# Patient Record
Sex: MALE | Race: WHITE | Employment: OTHER | URBAN - NONMETROPOLITAN AREA
[De-identification: names, ages, dates, MRNs, and addresses within clinical notes are randomized per-mention and may not be internally consistent; named-entity substitution may affect disease eponyms.]

---

## 2018-01-25 ENCOUNTER — TRANSFERRED RECORDS (OUTPATIENT)
Dept: HEALTH INFORMATION MANAGEMENT | Facility: HOSPITAL | Age: 73
End: 2018-01-25

## 2018-03-05 ENCOUNTER — TRANSFERRED RECORDS (OUTPATIENT)
Dept: HEALTH INFORMATION MANAGEMENT | Facility: HOSPITAL | Age: 73
End: 2018-03-05

## 2018-04-27 NOTE — OR NURSING
RE:  Garrison Mayorga 3/26/18    Garrison is scheduled for a EGD & Colonoscopy with Dr. Cardona on 5/4/18.    Garrison is on Coumadin and will need instructions  on his Coumadin from Dr. Griffiths.    He told me someone called him and said to stop his Coumadin 10 days prior to the procedure but does not know who it is.  He said he did not have instructions when to restart it but usually takes his normal dose after the procedure.      Thank you!

## 2018-05-03 ENCOUNTER — ANESTHESIA EVENT (OUTPATIENT)
Dept: SURGERY | Facility: HOSPITAL | Age: 73
End: 2018-05-03
Payer: MEDICARE

## 2018-05-03 NOTE — ANESTHESIA PREPROCEDURE EVALUATION
Anesthesia Evaluation     . Pt has had prior anesthetic. Type: General and MAC    No history of anesthetic complications          ROS/MED HX    ENT/Pulmonary:  - neg pulmonary ROS     Neurologic:  - neg neurologic ROS     Cardiovascular: Comment: AFib    (+) Dyslipidemia, hypertension-range: 139/70 today patient states normal, ---. Taking blood thinners Pt has received instructions: Instructions Given to patient: quit taking coumadin 9 days ago, INR 1.02. . . :. Irregular Heartbeat/Palpitations, .       METS/Exercise Tolerance:  >4 METS   Hematologic:  - neg hematologic  ROS       Musculoskeletal:   (+) arthritis, , , -       GI/Hepatic:     (+) bowel prep,       Renal/Genitourinary:  - ROS Renal section negative       Endo:  - neg endo ROS       Psychiatric:  - neg psychiatric ROS       Infectious Disease:  - neg infectious disease ROS       Malignancy:      - no malignancy   Other:    (+) No chance of pregnancy C-spine cleared: N/A, H/O Chronic Pain,H/O chronic opiod use , no other significant disability                    Physical Exam  Normal systems: cardiovascular and pulmonary    Airway   Mallampati: III  TM distance: >3 FB  Neck ROM: limited    Dental   (+) caps    Cardiovascular   Rhythm and rate: regular and normal      Pulmonary    breath sounds clear to auscultation                    Anesthesia Plan      History & Physical Review  History and physical reviewed and following examination; no interval change.    ASA Status:  3 .    NPO Status:  > 8 hours    Plan for MAC Reason for MAC:  Procedure to face, neck, head or breast, Deep or markedly invasive procedure (G8) and Chronic cardiopulmonary disease (G9)    Risks and benefits of MAC anesthetic discussed including dental damage, aspiration, loss of airway, conversion to general anesthetic, CV complications, MI, stroke, death. Pt wishes to proceed.       Postoperative Care      Consents  Anesthetic plan, risks, benefits and alternatives discussed with:   Patient..                          .

## 2018-05-04 ENCOUNTER — ANESTHESIA (OUTPATIENT)
Dept: SURGERY | Facility: HOSPITAL | Age: 73
End: 2018-05-04
Payer: MEDICARE

## 2018-05-04 ENCOUNTER — APPOINTMENT (OUTPATIENT)
Dept: LAB | Facility: HOSPITAL | Age: 73
End: 2018-05-04
Payer: MEDICARE

## 2018-05-04 ENCOUNTER — HOSPITAL ENCOUNTER (OUTPATIENT)
Facility: HOSPITAL | Age: 73
Discharge: HOME | End: 2018-05-04
Attending: INTERNAL MEDICINE | Admitting: INTERNAL MEDICINE
Payer: MEDICARE

## 2018-05-04 VITALS
HEIGHT: 77 IN | OXYGEN SATURATION: 98 % | RESPIRATION RATE: 18 BRPM | SYSTOLIC BLOOD PRESSURE: 123 MMHG | BODY MASS INDEX: 35.42 KG/M2 | WEIGHT: 300 LBS | TEMPERATURE: 97 F | DIASTOLIC BLOOD PRESSURE: 54 MMHG

## 2018-05-04 LAB — INR PPP: 1.02 (ref 0.8–1.2)

## 2018-05-04 PROCEDURE — 25000125 ZZHC RX 250: Performed by: NURSE ANESTHETIST, CERTIFIED REGISTERED

## 2018-05-04 PROCEDURE — 40000305 ZZH STATISTIC PRE PROC ASSESS I: Performed by: INTERNAL MEDICINE

## 2018-05-04 PROCEDURE — 71000027 ZZH RECOVERY PHASE 2 EACH 15 MINS: Performed by: INTERNAL MEDICINE

## 2018-05-04 PROCEDURE — 85610 PROTHROMBIN TIME: CPT | Performed by: NURSE ANESTHETIST, CERTIFIED REGISTERED

## 2018-05-04 PROCEDURE — 36000050 ZZH SURGERY LEVEL 2 1ST 30 MIN: Performed by: INTERNAL MEDICINE

## 2018-05-04 PROCEDURE — 25000128 H RX IP 250 OP 636: Performed by: NURSE ANESTHETIST, CERTIFIED REGISTERED

## 2018-05-04 PROCEDURE — 99100 ANES PT EXTEME AGE<1 YR&>70: CPT | Performed by: NURSE ANESTHETIST, CERTIFIED REGISTERED

## 2018-05-04 PROCEDURE — 27210794 ZZH OR GENERAL SUPPLY STERILE: Performed by: INTERNAL MEDICINE

## 2018-05-04 PROCEDURE — 36415 COLL VENOUS BLD VENIPUNCTURE: CPT | Performed by: NURSE ANESTHETIST, CERTIFIED REGISTERED

## 2018-05-04 PROCEDURE — 88305 TISSUE EXAM BY PATHOLOGIST: CPT | Mod: TC | Performed by: INTERNAL MEDICINE

## 2018-05-04 PROCEDURE — 43239 EGD BIOPSY SINGLE/MULTIPLE: CPT | Performed by: NURSE ANESTHETIST, CERTIFIED REGISTERED

## 2018-05-04 PROCEDURE — 37000008 ZZH ANESTHESIA TECHNICAL FEE, 1ST 30 MIN: Performed by: INTERNAL MEDICINE

## 2018-05-04 RX ORDER — ONDANSETRON 4 MG/1
4 TABLET, ORALLY DISINTEGRATING ORAL EVERY 30 MIN PRN
Status: DISCONTINUED | OUTPATIENT
Start: 2018-05-04 | End: 2018-05-04 | Stop reason: HOSPADM

## 2018-05-04 RX ORDER — SODIUM CHLORIDE, SODIUM LACTATE, POTASSIUM CHLORIDE, CALCIUM CHLORIDE 600; 310; 30; 20 MG/100ML; MG/100ML; MG/100ML; MG/100ML
INJECTION, SOLUTION INTRAVENOUS CONTINUOUS
Status: DISCONTINUED | OUTPATIENT
Start: 2018-05-04 | End: 2018-05-04 | Stop reason: HOSPADM

## 2018-05-04 RX ORDER — NALOXONE HYDROCHLORIDE 0.4 MG/ML
.1-.4 INJECTION, SOLUTION INTRAMUSCULAR; INTRAVENOUS; SUBCUTANEOUS
Status: DISCONTINUED | OUTPATIENT
Start: 2018-05-04 | End: 2018-05-04 | Stop reason: HOSPADM

## 2018-05-04 RX ORDER — MEPERIDINE HYDROCHLORIDE 25 MG/ML
12.5 INJECTION INTRAMUSCULAR; INTRAVENOUS; SUBCUTANEOUS
Status: DISCONTINUED | OUTPATIENT
Start: 2018-05-04 | End: 2018-05-04 | Stop reason: HOSPADM

## 2018-05-04 RX ORDER — LIDOCAINE 40 MG/G
CREAM TOPICAL
Status: DISCONTINUED | OUTPATIENT
Start: 2018-05-04 | End: 2018-05-04 | Stop reason: HOSPADM

## 2018-05-04 RX ORDER — LIDOCAINE HYDROCHLORIDE 20 MG/ML
INJECTION, SOLUTION INFILTRATION; PERINEURAL PRN
Status: DISCONTINUED | OUTPATIENT
Start: 2018-05-04 | End: 2018-05-04

## 2018-05-04 RX ORDER — PHENYLEPHRINE HCL IN 0.9% NACL 1 MG/10 ML
SYRINGE (ML) INTRAVENOUS PRN
Status: DISCONTINUED | OUTPATIENT
Start: 2018-05-04 | End: 2018-05-04

## 2018-05-04 RX ORDER — PROPOFOL 10 MG/ML
INJECTION, EMULSION INTRAVENOUS PRN
Status: DISCONTINUED | OUTPATIENT
Start: 2018-05-04 | End: 2018-05-04

## 2018-05-04 RX ORDER — ONDANSETRON 2 MG/ML
4 INJECTION INTRAMUSCULAR; INTRAVENOUS EVERY 30 MIN PRN
Status: DISCONTINUED | OUTPATIENT
Start: 2018-05-04 | End: 2018-05-04 | Stop reason: HOSPADM

## 2018-05-04 RX ADMIN — SODIUM CHLORIDE, POTASSIUM CHLORIDE, SODIUM LACTATE AND CALCIUM CHLORIDE: 600; 310; 30; 20 INJECTION, SOLUTION INTRAVENOUS at 09:10

## 2018-05-04 RX ADMIN — SODIUM CHLORIDE, POTASSIUM CHLORIDE, SODIUM LACTATE AND CALCIUM CHLORIDE: 600; 310; 30; 20 INJECTION, SOLUTION INTRAVENOUS at 08:45

## 2018-05-04 RX ADMIN — PROPOFOL 50 MG: 10 INJECTION, EMULSION INTRAVENOUS at 10:05

## 2018-05-04 RX ADMIN — PROPOFOL 20 MG: 10 INJECTION, EMULSION INTRAVENOUS at 10:19

## 2018-05-04 RX ADMIN — PROPOFOL 20 MG: 10 INJECTION, EMULSION INTRAVENOUS at 10:17

## 2018-05-04 RX ADMIN — PROPOFOL 50 MG: 10 INJECTION, EMULSION INTRAVENOUS at 10:03

## 2018-05-04 RX ADMIN — PROPOFOL 20 MG: 10 INJECTION, EMULSION INTRAVENOUS at 10:09

## 2018-05-04 RX ADMIN — PROPOFOL 20 MG: 10 INJECTION, EMULSION INTRAVENOUS at 10:13

## 2018-05-04 RX ADMIN — LIDOCAINE HYDROCHLORIDE 40 MG: 20 INJECTION, SOLUTION INFILTRATION; PERINEURAL at 10:03

## 2018-05-04 RX ADMIN — PROPOFOL 20 MG: 10 INJECTION, EMULSION INTRAVENOUS at 10:21

## 2018-05-04 RX ADMIN — Medication 50 MCG: at 10:12

## 2018-05-04 RX ADMIN — PROPOFOL 20 MG: 10 INJECTION, EMULSION INTRAVENOUS at 10:11

## 2018-05-04 RX ADMIN — PROPOFOL 20 MG: 10 INJECTION, EMULSION INTRAVENOUS at 10:15

## 2018-05-04 RX ADMIN — PROPOFOL 20 MG: 10 INJECTION, EMULSION INTRAVENOUS at 10:07

## 2018-05-04 RX ADMIN — PROPOFOL 20 MG: 10 INJECTION, EMULSION INTRAVENOUS at 10:08

## 2018-05-04 NOTE — ANESTHESIA CARE TRANSFER NOTE
Patient: Garrison Mayorga    Procedure(s):  UPPER ENDOSCOPY WITH BIOPSIES AND COLONOSCOPY - Wound Class: II-Clean Contaminated    Diagnosis: ANEMIA  Diagnosis Additional Information: No value filed.    Anesthesia Type:   MAC     Note:  Airway :Nasal Cannula  Patient transferred to:Phase II  Handoff Report: Identifed the Patient, Identified the Reponsible Provider, Reviewed the pertinent medical history, Discussed the surgical course, Reviewed Intra-OP anesthesia mangement and issues during anesthesia, Set expectations for post-procedure period and Allowed opportunity for questions and acknowledgement of understanding      Vitals: (Last set prior to Anesthesia Care Transfer)    CRNA VITALS  5/4/2018 0953 - 5/4/2018 1028      5/4/2018             Resp Rate (set): 8                Electronically Signed By: NEGAR Salvador CRNA  May 4, 2018  10:28 AM

## 2018-05-04 NOTE — DISCHARGE INSTRUCTIONS
INSTRUCTIONS AFTER COLONOSCOPY    WHEN YOU ARE BACK HOME:    Plan to rest for an hour or two after you get home.    You may have some cramping or pressure until you pass gas.    You may resume your regular medications.    Eat a small, light meal at first, and then gradually return to normal meal sizes.  If you had a polyp removed:    Slight bleeding may occur.  You may have a slight blood stain on the toilet paper after a bowel movement.    To lessen the chance of bleeding, avoid heavy exercise for ONE WEEK.  This includes heavy lifting, vigorous sport activities, and heavy physical labor.  You may resume your normal sexual activity.      Avoid aspirin or aspirin products if instructed by your doctor.    WHAT TO WATCH FOR:  Problems rarely occur after the exam; however, it is important for you to watch for early signs of possible problems.  If you have     Unusual pain in your abdomen    Nausea and vomiting that persists    Excessive bleeding    Black or bloody bowel movements    Fever or temperature above 100.6 F  Please call your doctor ([facility] [phone]) or go to the nearest hospital emergency room.    Post-Anesthesia Patient Instructions    IMMEDIATELY FOLLOWING SURGERY:  Do not drive or operate machinery for the first twenty four hours after surgery.  Do not make any important decisions for twenty four hours after surgery or while taking narcotic pain medications or sedatives.  If you develop intractable nausea and vomiting or a severe headache please notify your doctor immediately.    FOLLOW-UP:  Please make an appointment with your surgeon as instructed. You do not need to follow up with anesthesia unless specifically instructed to do so.    WOUND CARE INSTRUCTIONS (if applicable):  Keep a dry clean dressing on the anesthesia/puncture wound site if there is drainage.  Once the wound has quit draining you may leave it open to air.  Generally you should leave the bandage intact for twenty four  hours unless there is drainage.  If the epidural site drains for more than 36-48 hours please call the anesthesia department.    QUESTIONS?:  Please feel free to call your physician or the hospital  if you have any questions, and they will be happy to assist you.             UPPER ENDOSCOPY    PURPOSE:  An Upper Endoscopy is a procedure in which the doctor passes a flexible, lighted tube called a gastroscope through your mouth into your stomach in order to:    See the lining of the esophagus, stomach, and duodenum (first part of the small intestine).    Look for bleeding, inflammation (swelling), abnormal tumors or tissue, or ulcers.    Take biopsy specimens.  (Biopsies do not hurt.)    TELL YOUR DOCTOR IF:     You have a heart condition, heart murmur, or have had heart valve surgery.    You have had angioplasty with stents put in within the last year.    You are taking blood thinners - Aspirin, Anti-inflammatory pain pills (like Motrin, ibuprofen, Naproxen, Feldene, Advil, etc.), Coumadin, Plavix.    You have diabetes - contact your regular doctor for management of your insulin.    YOU NEED TO:    Have someone drive you home.  You are not allowed to drive until the next day.  (If you take a taxi, the person staying with you after surgery must ride with you in the taxi.  The  is not responsible for you).    Have someone stay with you for four (4) hours after you leave the hospital.    Know the time to be at admitting:  A nurse will call you with the time the afternoon before your procedure.  If your procedure is on Monday, you will be called on Friday.  If you have not been contacted with the time, call the Admitting Department at [phone] or [phone], [phone] after 5 pm (Admitting is open 24 hours daily).    Talk with the Surgery Patient Education Nurses.  Please call them @ [phone] or toll free [phone] after 8:00 a.m. Monday through Friday.    TO PREPARE FOR THE  PROCEDURE:      ONE WEEK BEFORE:    Stop Aspirin, anti-inflammatory pain pills (Motrin, ibuprofen, Naproxen, Feldene, Advil, etc.).  It is OK to take Tylenol (acetaminophen).    Your doctor will tell you what to do if you are on Coumadin or Plavix.     NIGHT BEFORE:    Do not eat or drink anything after midnight.  Your stomach needs to be empty.     DAY OF YOUR PROCEDURE:    Take your pills you were told to take.  Do not take diabetic pills.  If you are on Insulin, take the dose your doctor told you to take.    Wear loose, comfortable clothing and shoes.    Remove ALL jewelry including wedding rings.  Leave valuables at home.    Come to the hospital Admitting Department located at the [place] entrance on the lower level.    In Same Day surgery, you will be asked to change into an exam gown.    You will be asked your name, birth date, and what you are having done by every person who is involved with your care.    Your health history, medications, and allergies will be reviewed and verified.    An IV (intravenous line) will be started in your hand.  DURING THE UPPER ENDOSCOPY:    Your doctor and a registered nurse will be with you throughout the procedure which takes about 30 minutes.    You will either lie on your left side or on your back.    Medications will be given to you to help you relax and reduce the gag reflex when swallowing the scope.    Your doctor may need to insert air into your stomach to see better.  This may cause fullness or a cramping sensation.  The air will be removed at the end of the exam.    AFTER THE PROCEDURE    You will return to Same Day Surgery to rest for about an hour before you go home.    The doctor will talk with you and your family.    A family member/friend may visit with you.    You may burp up any air remaining in your stomach.    You may feel dizzy or light-headed from the medicine.    Your nurse will go over the discharge instructions with you and your caregiver and answer any of  your questions.      You will be contacted the next day to check on how you are doing.    If biopsies were taken, you will be contacted with the results usually within 3 days.  BACK AT HOME    Rest for an hour or two after you get home.    When your throat is no longer numb and you have a gag reflex, take a few sips of cool water.  If you can swallow comfortably, you may start eating again.    You may have a mild sore throat for the rest of the day.  WHAT TO WATCH FOR:  Problems rarely occur after the exam, but it is important to be aware of the early signs of a complication.  Call your doctor immediately if you have:    Difficulty swallowing or breathing    Unusual pain in your stomach or chest    Vomiting blood or dark material that looks like coffee grounds    Black or tarry stools    Temperature over 100.6 degrees    MORE QUESTIONS?  Please ask your doctor or nurse before the exam begins  or call your doctor at the clinic.    IF YOU MUST CANCEL YOUR PROCEDURE THE EVENING/NIGHT BEFORE, PLEASE CALL HOSPITAL ADMITTING -[phone] OR TOLL FREE [phone], [phone].    Phone Numbers:  Hospital - [phone] [phone]  [facility] - [phone]  Surgery Patient Education - [phone] or toll free [phone]

## 2018-05-04 NOTE — ANESTHESIA POSTPROCEDURE EVALUATION
Patient: Garrison Mayorga    Procedure(s):  UPPER ENDOSCOPY WITH BIOPSIES AND COLONOSCOPY - Wound Class: II-Clean Contaminated    Diagnosis:ANEMIA  Diagnosis Additional Information: No value filed.    Anesthesia Type:  MAC    Note:  Anesthesia Post Evaluation    Patient location during evaluation: Phase 2  Patient participation: Able to fully participate in evaluation  Level of consciousness: awake and alert  Pain management: adequate  Airway patency: patent  Cardiovascular status: acceptable  Respiratory status: acceptable  Hydration status: acceptable  PONV: none             Last vitals:  Vitals:    05/04/18 1045 05/04/18 1050 05/04/18 1100   BP: 121/62 123/54    Resp: 18 18    Temp:      SpO2: 94% 99% 98%         Electronically Signed By: NEGAR Reardon CRNA  May 4, 2018  12:27 PM

## 2018-05-04 NOTE — H&P
Pre-Endoscopy History and Physical     Garrison Mayorga MRN# [id number]   YOB: 1945 Age: 73 year old     Primary care provider: Yusuf Griffiths  Type of Endoscopy: Colonoscopy with possible biopsy, possible polypectomy and Esophagogastroduodenoscopy with possible biopsy, possible dilation, possible foreign body removal  Reason for Procedure: Anemia, CRC Screening, Dyspepsia  Type of Anesthesia Anticipated: MAC    HPI:    Garrison is a 73 year old male who will be undergoing the above procedure.      A history and physical has been performed. The patient's medications and allergies have been reviewed. The risks and benefits of the procedure and the sedation options and risks were discussed with the patient.  All questions were answered and informed consent was obtained.      He denies a personal or family history of anesthesia complications or bleeding disorders.     There is no problem list on file for this patient.       Past Medical History:   Diagnosis Date     Antiplatelet or antithrombotic long-term use      Arrhythmia      Hypertension      Other chronic pain         Past Surgical History:   Procedure Laterality Date     BACK SURGERY      4 low back surgeries     CARDIAC SURGERY  12/26/2017    ablation [facility]     CARDIOVERSION  08/2017    [facility]     COLONOSCOPY  03/30/2006    normal      ORTHOPEDIC SURGERY  2007    left knee arthroplasty     ORTHOPEDIC SURGERY      left fourth trigger finger release     ORTHOPEDIC SURGERY  2009    total right hip     ORTHOPEDIC SURGERY  01/15/2015    removal of hardware right lower leg-Dr. Samson [facility]      ORTHOPEDIC SURGERY  05/02/2016    right rotator cuff repair  Dr. Lazo      SPINE SURGERY      anterior/posterior fusion       Social History   Substance Use Topics     Smoking status: Never Smoker     Smokeless tobacco: Never Used     Alcohol use Yes      Comment: occasional beer       History reviewed. No pertinent family  "history.    Prior to Admission medications    Medication Sig Start Date End Date Taking? Authorizing Provider   acetaminophen-codeine (TYLENOL #4) 300-60 MG per tablet Take 1-2 tablets by mouth every 6 hours as needed for moderate pain   Yes Reported, Patient   Atorvastatin Calcium (LIPITOR PO) Take 40 mg by mouth daily   Yes Reported, Patient   Furosemide (LASIX PO) Take 80 mg by mouth daily   Yes Reported, Patient   Omeprazole (PRILOSEC PO) Take 40 mg by mouth every morning   Yes Reported, Patient   OXCARBAZEPINE PO Take 600 mg by mouth 2 times daily Tegretol   Yes Reported, Patient   SOTALOL HCL PO Take 80 mg by mouth every 12 hours   Yes Reported, Patient   Warfarin Sodium (COUMADIN PO) Take 5 mg by mouth daily As directed   Yes Reported, Patient       No Known Allergies     REVIEW OF SYSTEMS:   5 point ROS negative except as noted above in HPI, including Gen., Resp., CV, GI &  system review.    PHYSICAL EXAM:   /70  Temp 97  F (36.1  C) (Oral)  Resp 16  Ht 1.956 m (6' 5\")  Wt 136.1 kg (300 lb)  SpO2 99%  BMI 35.57 kg/m2 Estimated body mass index is 35.57 kg/(m^2) as calculated from the following:    Height as of this encounter: 1.956 m (6' 5\").    Weight as of this encounter: 136.1 kg (300 lb).   GENERAL APPEARANCE: alert, and oriented  MENTAL STATUS: alert  AIRWAY EXAM: Mallampatti Class II (visualization of the soft palate, fauces, and uvula)  RESP: lungs clear to auscultation - no rales, rhonchi or wheezes  CV: regular rates and rhythm  DIAGNOSTICS:    Not indicated    IMPRESSION   ASA Class 2 - Mild systemic disease    PLAN:   Plan for Colonoscopy with possible biopsy, possible polypectomy and Esophagogastroduodenoscopy with possible biopsy, possible dilation, possible foreign body removal. We discussed the risks, benefits and alternatives and the patient wished to proceed.    The above has been forwarded to the consulting provider.      Signed Electronically by: Michel Cardona  May 4, " 2018

## 2018-05-04 NOTE — BRIEF OP NOTE
[facility] Brief Op Note    Pre-operative diagnosis: ANEMIA   Post-operative diagnosis Gastroduodenitis, Hiatal Hernia, Mild colonic pandiverticulosis   Procedure: Procedure(s):  UPPER ENDOSCOPY WITH BIOPSIES AND COLONOSCOPY - Wound Class: II-Clean Contaminated   Surgeon: Michel Cardona MD   Anesthesia: Monitor Anesthesia Care    Total IV fluids: (See anesthesia record)   Drains: None   Specimens: Biopsies   Findings: Gastroduodenitis, Hiatal Hernia, Mild colonic pandiverticulosis   Complications: None   Condition: Stable   Comments: See dictated operative report for full details     Michel Cardona MD  5/4/2018  10:23 AM

## 2018-05-04 NOTE — IP AVS SNAPSHOT
MRN:[id number]                      After Visit Summary   5/4/2018    Garrison Mayorga    MRN: [id number]           Thank you!     Thank you for choosing [facility] for your care. Our goal is always to provide you with excellent care. Hearing back from our patients is one way we can continue to improve our services. Please take a few minutes to complete the written survey that you may receive in the mail after you visit with us. Thank you!        Patient Information     Date Of Birth          1945        About your hospital stay     You were admitted on:  May 4, 2018 You last received care in the:  HI Preop/Phase II    You were discharged on:  May 4, 2018       Who to Call     For medical emergencies, please call 911.  For non-urgent questions about your medical care, please call your primary care provider or clinic, [phone]  For questions related to your surgery, please call your surgery clinic        Attending Provider     Provider Specialty    Michel Cardona MD Gastroenterology       Primary Care Provider Office Phone # Fax #    Yusuf Griffiths [phone]      Further instructions from your care team           INSTRUCTIONS AFTER COLONOSCOPY    WHEN YOU ARE BACK HOME:    Plan to rest for an hour or two after you get home.    You may have some cramping or pressure until you pass gas.    You may resume your regular medications.    Eat a small, light meal at first, and then gradually return to normal meal sizes.  If you had a polyp removed:    Slight bleeding may occur.  You may have a slight blood stain on the toilet paper after a bowel movement.    To lessen the chance of bleeding, avoid heavy exercise for ONE WEEK.  This includes heavy lifting, vigorous sport activities, and heavy physical labor.  You may resume your normal sexual activity.      Avoid aspirin or aspirin products if instructed by your doctor.    WHAT TO WATCH FOR:  Problems rarely occur after the exam;  however, it is important for you to watch for early signs of possible problems.  If you have     Unusual pain in your abdomen    Nausea and vomiting that persists    Excessive bleeding    Black or bloody bowel movements    Fever or temperature above 100.6 F  Please call your doctor ([facility] [phone]) or go to the nearest hospital emergency room.    Post-Anesthesia Patient Instructions    IMMEDIATELY FOLLOWING SURGERY:  Do not drive or operate machinery for the first twenty four hours after surgery.  Do not make any important decisions for twenty four hours after surgery or while taking narcotic pain medications or sedatives.  If you develop intractable nausea and vomiting or a severe headache please notify your doctor immediately.    FOLLOW-UP:  Please make an appointment with your surgeon as instructed. You do not need to follow up with anesthesia unless specifically instructed to do so.    WOUND CARE INSTRUCTIONS (if applicable):  Keep a dry clean dressing on the anesthesia/puncture wound site if there is drainage.  Once the wound has quit draining you may leave it open to air.  Generally you should leave the bandage intact for twenty four hours unless there is drainage.  If the epidural site drains for more than 36-48 hours please call the anesthesia department.    QUESTIONS?:  Please feel free to call your physician or the hospital  if you have any questions, and they will be happy to assist you.             UPPER ENDOSCOPY    PURPOSE:  An Upper Endoscopy is a procedure in which the doctor passes a flexible, lighted tube called a gastroscope through your mouth into your stomach in order to:    See the lining of the esophagus, stomach, and duodenum (first part of the small intestine).    Look for bleeding, inflammation (swelling), abnormal tumors or tissue, or ulcers.    Take biopsy specimens.  (Biopsies do not hurt.)    TELL YOUR DOCTOR IF:     You have a heart condition, heart murmur, or have  had heart valve surgery.    You have had angioplasty with stents put in within the last year.    You are taking blood thinners - Aspirin, Anti-inflammatory pain pills (like Motrin, ibuprofen, Naproxen, Feldene, Advil, etc.), Coumadin, Plavix.    You have diabetes - contact your regular doctor for management of your insulin.    YOU NEED TO:    Have someone drive you home.  You are not allowed to drive until the next day.  (If you take a taxi, the person staying with you after surgery must ride with you in the taxi.  The  is not responsible for you).    Have someone stay with you for four (4) hours after you leave the hospital.    Know the time to be at admitting:  A nurse will call you with the time the afternoon before your procedure.  If your procedure is on Monday, you will be called on Friday.  If you have not been contacted with the time, call the Admitting Department at [phone] or [phone], ext. [phone] after 5 pm (Admitting is open 24 hours daily).    Talk with the Surgery Patient Education Nurses.  Please call them @ [phone] or toll free [phone] after 8:00 a.m. Monday through Friday.    TO PREPARE FOR THE PROCEDURE:      ONE WEEK BEFORE:    Stop Aspirin, anti-inflammatory pain pills (Motrin, ibuprofen, Naproxen, Feldene, Advil, etc.).  It is OK to take Tylenol (acetaminophen).    Your doctor will tell you what to do if you are on Coumadin or Plavix.     NIGHT BEFORE:    Do not eat or drink anything after midnight.  Your stomach needs to be empty.     DAY OF YOUR PROCEDURE:    Take your pills you were told to take.  Do not take diabetic pills.  If you are on Insulin, take the dose your doctor told you to take.    Wear loose, comfortable clothing and shoes.    Remove ALL jewelry including wedding rings.  Leave valuables at home.    Come to the hospital Admitting Department located at the [facility] on the lower level.    In Same Day surgery, you will be asked to change into an  exam gown.    You will be asked your name, birth date, and what you are having done by every person who is involved with your care.    Your health history, medications, and allergies will be reviewed and verified.    An IV (intravenous line) will be started in your hand.  DURING THE UPPER ENDOSCOPY:    Your doctor and a registered nurse will be with you throughout the procedure which takes about 30 minutes.    You will either lie on your left side or on your back.    Medications will be given to you to help you relax and reduce the gag reflex when swallowing the scope.    Your doctor may need to insert air into your stomach to see better.  This may cause fullness or a cramping sensation.  The air will be removed at the end of the exam.    AFTER THE PROCEDURE    You will return to Same Day Surgery to rest for about an hour before you go home.    The doctor will talk with you and your family.    A family member/friend may visit with you.    You may burp up any air remaining in your stomach.    You may feel dizzy or light-headed from the medicine.    Your nurse will go over the discharge instructions with you and your caregiver and answer any of your questions.      You will be contacted the next day to check on how you are doing.    If biopsies were taken, you will be contacted with the results usually within 3 days.  BACK AT HOME    Rest for an hour or two after you get home.    When your throat is no longer numb and you have a gag reflex, take a few sips of cool water.  If you can swallow comfortably, you may start eating again.    You may have a mild sore throat for the rest of the day.  WHAT TO WATCH FOR:  Problems rarely occur after the exam, but it is important to be aware of the early signs of a complication.  Call your doctor immediately if you have:    Difficulty swallowing or breathing    Unusual pain in your stomach or chest    Vomiting blood or dark material that looks like coffee grounds    Black or tarry  "stools    Temperature over 100.6 degrees    MORE QUESTIONS?  Please ask your doctor or nurse before the exam begins  or call your doctor at the clinic.    IF YOU MUST CANCEL YOUR PROCEDURE THE EVENING/NIGHT BEFORE, PLEASE CALL HOSPITAL ADMITTING -[phone] OR TOLL FREE [phone], [phone].    Phone Numbers:  [facility] - [phone]  [facility] - [phone]  Surgery Patient Education - [phone] or toll free [phone]    Warfarin Instruction     You have started taking a medicine called warfarin. This is a blood-thinning medicine (anticoagulant). It helps prevent and treat blood clots.      Before leaving the hospital, make sure you know how much to take and how long to take it.      You will need regular blood tests to make sure your blood is clotting safely. It is very important to see your doctor for regular blood tests.    Talk to your doctor before taking any new medicine (this includes over-the-counter drugs and herbal products). Many medicines can interact with warfarin. This may cause more bleeding or too much clotting.     Eating a lot of vitamin K--found in green, leafy vegetables--can change the way warfarin works in your body. Do NOT avoid these foods. Instead, try to eat the same amount each day.     Bleeding is the most common side-effect of warfarin. You may notice bleeding gums, a bloody nose, bruises and bleeding longer when you cut yourself. See a doctor at once if:   o You cough up blood  o You find blood in your stool (poop)  o You have a deep cut, or a cut that bleeds longer than 10 minutes   o You have a bad cut, hard fall, accident or hit your head (go to urgent care or the emergency room).    For women who can get pregnant: This medicine can harm an unborn baby. Be very careful not to get pregnant while taking this medicine. If you think you might be pregnant, call your doctor right away.    For more information, read \"Guide to Warfarin Therapy,  the " "booklet you received in the hospital.        Pending Results     No orders found from 2018 to 2018.            Admission Information     Date & Time Provider Department Dept. Phone    2018 Michel Cardona MD HI Preop/Phase [phone]      Your Vitals Were     Blood Pressure Temperature Respirations Height Weight Pulse Oximetry    115/56 97  F (36.1  C) (Oral) 18 1.956 m (6' 5\") 136.1 kg (300 lb) 96%    BMI (Body Mass Index)                   35.57 kg/m2           [email or web address] Information     [employer] lets you send messages to your doctor, view your test results, renew your prescriptions, schedule appointments and more. To sign up, go to [email or web address] . Click on \"Log in\" on the left side of the screen, which will take you to the Welcome page. Then click on \"Sign up Now\" on the right side of the page.     You will be asked to enter the access code listed below, as well as some personal information. Please follow the directions to create your username and password.     Your access code is: [email or web address]  Expires: 2018  8:32 AM     Your access code will  in 90 days. If you need help or a new code, please call your [place] clinic or [phone].        Care EveryWhere ID     This is your Care EveryWhere ID. This could be used by other organizations to access your [place] medical records  [id number]        Equal Access to Services     [facility] CenterNAYLA AH: Hadii celeste mast hadasho Sorosaliaali, waaxda luqadaha, qaybta kaalmada leonor, luba leal . So [facility] [phone].    ATENCIÓN: Si habla español, tiene a diaz disposición servicios gratuitos de asistencia lingüística. Llame al [phone].    We comply with applicable federal civil rights laws and [place] laws. We do not discriminate on the basis of race, color, national origin, age, disability, sex, sexual orientation, or gender identity.               Review of your medicines      CONTINUE these medicines which have " NOT CHANGED        Dose / Directions    acetaminophen-codeine 300-60 MG per tablet   Commonly known as:  TYLENOL #4        Dose:  1-2 tablet   Take 1-2 tablets by mouth every 6 hours as needed for moderate pain   Refills:  0       COUMADIN PO        Dose:  5 mg   Take 5 mg by mouth daily As directed   Refills:  0       LASIX PO        Dose:  80 mg   Take 80 mg by mouth daily   Refills:  0       LIPITOR PO        Dose:  40 mg   Take 40 mg by mouth daily   Refills:  0       OXCARBAZEPINE PO        Dose:  600 mg   Take 600 mg by mouth 2 times daily Tegretol   Refills:  0       PRILOSEC PO        Dose:  40 mg   Take 40 mg by mouth every morning   Refills:  0       SOTALOL HCL PO        Dose:  80 mg   Take 80 mg by mouth every 12 hours   Refills:  0                Protect others around you: Learn how to safely use, store and throw away your medicines at www.disposemymeds.org.        Information about OPIOIDS     PRESCRIPTION OPIOIDS: WHAT YOU NEED TO KNOW   You have a prescription for an opioid (narcotic) pain medicine. Opioids can cause addiction. If you have a history of chemical dependency of any type, you are at a higher risk of becoming addicted to opioids. Only take this medicine after all other options have been tried. Take it for as short a time and as few doses as possible.     Do not:    Drive. If you drive while taking these medicines, you could be arrested for driving under the influence (DUI).    Operate heavy machinery    Do any other dangerous activities while taking these medicines.     Drink any alcohol while taking these medicines.      Take with any other medicines that contain acetaminophen. Read all labels carefully. Look for the word  acetaminophen  or  Tylenol.  Ask your pharmacist if you have questions or are unsure.    Store your pills in a secure place, locked if possible. We will not replace any lost or stolen medicine. If you don t finish your medicine, please throw away (dispose) as directed  by your pharmacist. The [place] Pollution Control Agency has more information about safe disposal: https://www.pca.[facility].mn.us/living-green/managing-unwanted-medications    All opioids tend to cause constipation. Drink plenty of water and eat foods that have a lot of fiber, such as fruits, vegetables, prune juice, apple juice and high-fiber cereal. Take a laxative (Miralax, milk of magnesia, Colace, Senna) if you don t move your bowels at least every other day.              Medication List: This is a list of all your medications and when to take them. Check marks below indicate your daily home schedule. Keep this list as a reference.      Medications           Morning Afternoon Evening Bedtime As Needed    acetaminophen-codeine 300-60 MG per tablet   Commonly known as:  TYLENOL #4   Take 1-2 tablets by mouth every 6 hours as needed for moderate pain                                COUMADIN PO   Take 5 mg by mouth daily As directed                                LASIX PO   Take 80 mg by mouth daily                                LIPITOR PO   Take 40 mg by mouth daily                                OXCARBAZEPINE PO   Take 600 mg by mouth 2 times daily Tegretol                                PRILOSEC PO   Take 40 mg by mouth every morning                                SOTALOL HCL PO   Take 80 mg by mouth every 12 hours

## 2018-05-04 NOTE — IP AVS SNAPSHOT
HI Preop/Phase II    [address] [address]    Phone:  [phone]                                       After Visit Summary   5/4/2018    Garrison Mayorga    MRN: [id number]           After Visit Summary Signature Page     I have received my discharge instructions, and my questions have been answered. I have discussed any challenges I see with this plan with the nurse or doctor.    ..........................................................................................................................................  Patient/Patient Representative Signature      ..........................................................................................................................................  Patient Representative Print Name and Relationship to Patient    ..................................................               ................................................  Date                                            Time    ..........................................................................................................................................  Reviewed by Signature/Title    ...................................................              ..............................................  Date                                                            Time

## 2018-05-04 NOTE — OR NURSING
Patient and responsible adult given discharge instructions with no questions regarding instructions. Alexi score 20. Pain level 0/10.  Discharged from unit via walking. Patient discharged to home.

## 2018-05-04 NOTE — OP NOTE
Procedure Date: 05/04/2018      REFERRING PHYSICIAN:  Dr. Cox      PROCEDURE:     1.  EGD with multiple biopsies.   2.  Colonoscopy.      PREPROCEDURE DIAGNOSES:   1.  Anemia of unknown origin.   2.  Change in bowel habits.   3.  Dyspepsia.   4.  Colorectal cancer screening.      HISTORY OF PRESENT ILLNESS:  Please refer to the H and P for details.      PHYSICAL EXAMINATION:  Cardiopulmonary examination unchanged from previous exam.  Please refer to H and P for details.      MEDICATIONS:  MAC per Anesthesia Service.  Monitored parameters within normal limits throughout.      PROCEDURE:  After informed consent was obtained, the patient was placed in the left lateral decubitus position.  An adult video gastroscope was advanced all the way to the duodenum.  The duodenal mucosa was slightly atrophied.  Multiple biopsies were obtained from the duodenum.  Turning our attention to the stomach, I noted minimal erythema of the gastric mucosa consistent with gastropathy.  Multiple gastric biopsies were obtained.  A retroflexion view revealed a diminutive hiatal hernia.  The distal esophagus had LA class esophagitis.  The air was then desufflated and the scope was withdrawn fully and completely without any complications.      We then started the colonoscopy.  An adult video colonoscope was advanced all the way to the terminal ileum.  Noted scattered diverticula all over the colon.  There was melanosis coli which was more apparent in the right colon.  Copious irrigation and suctioning during the slow withdrawal did not reveal any other abnormalities.  No polyps, masses, or any other bleeding sources.  A retroflexion view revealed small nonbleeding internal hemorrhoids.  The air was then desufflated and the scope withdrawn fully and completely without any complications.      POSTPROCEDURE DIAGNOSIS:   1.  Gastroesophageal reflux disease.   2.  Diminutive hiatal hernia.   3.  Mild and scattered pancolonic diverticulosis.    4.  Internal hemorrhoids.      RECOMMENDATIONS:   1.  Repeat colonoscopy in 10 years.   2.  Continue PPIs.   3.  High-fiber diet.   4.  Metamucil 1 tablespoon b.i.d.   5.  Follow the biopsies.   6.  Discharge home.      Thank you for allowing us to participate in his management.         CECILIA HERNANDEZ MD             D: 2018   T: 2018   MT: NTS      Name:     SONG GARDUNO   MRN:      [id number]        Account:        [id number]   :      1945           Procedure Date: 2018      Document: [id number]       cc: Josephine Cox MD

## 2018-05-07 LAB — COPATH REPORT: NORMAL

## 2022-05-18 ENCOUNTER — TRANSFERRED RECORDS (OUTPATIENT)
Dept: HEALTH INFORMATION MANAGEMENT | Facility: CLINIC | Age: 77
End: 2022-05-18

## 2023-07-03 ENCOUNTER — TRANSFERRED RECORDS (OUTPATIENT)
Dept: HEALTH INFORMATION MANAGEMENT | Facility: CLINIC | Age: 78
End: 2023-07-03

## 2023-07-19 ENCOUNTER — HOSPITAL ENCOUNTER (EMERGENCY)
Facility: HOSPITAL | Age: 78
Discharge: HOME | End: 2023-07-19
Attending: NURSE PRACTITIONER | Admitting: NURSE PRACTITIONER
Payer: MEDICARE

## 2023-07-19 VITALS
OXYGEN SATURATION: 97 % | RESPIRATION RATE: 16 BRPM | DIASTOLIC BLOOD PRESSURE: 72 MMHG | HEART RATE: 102 BPM | SYSTOLIC BLOOD PRESSURE: 123 MMHG | TEMPERATURE: 98.3 F

## 2023-07-19 DIAGNOSIS — R29.898 LEG WEAKNESS, BILATERAL: ICD-10-CM

## 2023-07-19 PROBLEM — L89.306 PRESSURE INJURY OF DEEP TISSUE OF BUTTOCK: Status: ACTIVE | Noted: 2022-10-31

## 2023-07-19 PROBLEM — N40.1 BENIGN PROSTATIC HYPERPLASIA WITH URINARY RETENTION: Status: ACTIVE | Noted: 2022-11-09

## 2023-07-19 PROBLEM — G03.9 ADHESIVE ARACHNOIDITIS: Status: ACTIVE | Noted: 2018-03-21

## 2023-07-19 PROBLEM — R33.8 BENIGN PROSTATIC HYPERPLASIA WITH URINARY RETENTION: Status: ACTIVE | Noted: 2022-11-09

## 2023-07-19 PROBLEM — D50.9 IRON (FE) DEFICIENCY ANEMIA: Status: ACTIVE | Noted: 2022-10-31

## 2023-07-19 PROBLEM — G57.93 NEUROPATHIC PAIN OF BOTH LEGS: Status: ACTIVE | Noted: 2018-03-16

## 2023-07-19 PROBLEM — I51.89 DIASTOLIC DYSFUNCTION: Status: ACTIVE | Noted: 2023-03-22

## 2023-07-19 PROBLEM — R73.03 PREDIABETES: Status: ACTIVE | Noted: 2023-06-13

## 2023-07-19 PROBLEM — I48.0 PAROXYSMAL ATRIAL FIBRILLATION (H): Status: ACTIVE | Noted: 2022-08-30

## 2023-07-19 PROBLEM — N31.9 NEUROGENIC BLADDER: Status: ACTIVE | Noted: 2022-12-15

## 2023-07-19 PROCEDURE — 99283 EMERGENCY DEPT VISIT LOW MDM: CPT

## 2023-07-19 PROCEDURE — 99282 EMERGENCY DEPT VISIT SF MDM: CPT | Performed by: NURSE PRACTITIONER

## 2023-07-19 ASSESSMENT — ENCOUNTER SYMPTOMS
WOUND: 1
ALLERGIC/IMMUNOLOGIC NEGATIVE: 1
HEMATOLOGIC/LYMPHATIC NEGATIVE: 1
CONSTITUTIONAL NEGATIVE: 1
PSYCHIATRIC NEGATIVE: 1
RESPIRATORY NEGATIVE: 1
CARDIOVASCULAR NEGATIVE: 1
EYES NEGATIVE: 1
ENDOCRINE NEGATIVE: 1
DIFFICULTY URINATING: 1
GASTROINTESTINAL NEGATIVE: 1
WEAKNESS: 1

## 2023-07-19 ASSESSMENT — ACTIVITIES OF DAILY LIVING (ADL)
ADLS_ACUITY_SCORE: 35
ADLS_ACUITY_SCORE: 33

## 2023-07-19 NOTE — ED NOTES
"Care Transitions focused note:      Chart reviewed, patient presented to ED via ambulance from [facility].  Was discharged yesterday from [facility].  Referral to [facility] line made and he also has home health care set up per  notes.     Met with patient.  Requesting SNF placement.  Pt was in the [facility] observation status for 10 days and basically stated they \"didn't help me\"  It sounds like they did try and place him but since he was observation status he did not have any insurance coverage for placement.  I explained to him that the rules are the same at any hospital regarding placement.  He would be private pay at a facility.Patient is not willing to do this at this time.    Rec that patient discuss with / or the county.  He likely does not need snf and assisted living seems more appropriate or PCA services at home.  Has children in the area that I also encouraged him to reach out to for assistance until either placement or more appropriate home services could be arranged.    Per patient report he states he is up walking with a walker at home.  Home Care should be contacting him as well as the [facility] line as [place] did set this up for him.    Call to Diana Padilla RN [facility] care coordinator.  She will reach out to his PCP and obtain follow up appointment for him.  They will reach out and assist with referral.    Pt will discharge home at this time. [facility] will transport.    Pt has my contact information for any further questions or concerns.    BLESSING Carlos  "

## 2023-07-19 NOTE — ED NOTES
Pt reports he came to the ED looking for help at home or placement in a nursing home. Pt has a wound vac in place from a previous hospitalization. Pt reports that if he's able to make it at home that home care is supposed to come in a manage wound. Pt does not wish to have a medical exam today. Case management is working with patient on resources.

## 2023-07-19 NOTE — DISCHARGE INSTRUCTIONS
Follow-up with your primary care provider for reevaluation.  Contact your primary care provider if you have any questions or concerns.  Do not hesitate to return to the ER if any new or worsening symptoms.     Please read the attached instructions (if any).  They highlight more specific treatments and interventions for you at home.              Thank you for letting me participate in your care and wish you a fast and uneventful recovery,    Aditya BILLS CNP    Do not hesitate to contact me with questions or concerns.  [email or web address]  [email or web address]

## 2023-07-19 NOTE — ED TRIAGE NOTES
Pt presents with c/o bilateral leg weakness that has been present for about a year. Pt most recently was at a nursing home in [place], was sent home from there and that lasted about 4 hours. Pt then took an ambulance to [facility] in [place], spent 10 days there and was sent home yesterday. Pt would like to be pl;aced in a nursing home.

## 2023-07-19 NOTE — ED PROVIDER NOTES
History     Chief Complaint   Patient presents with     Extremity Weakness     HPI   Garrison Mayorga is a 78 year old individual with history of BPH, diastolic dysfunction, hypertension, JULES, major depressive disorder, neurogenic bladder, neuropathic pain in bilateral lower extremities, paroxysmal atrial fibrillation on anticoagulation with warfarin, prediabetes, comes in requesting nursing home placement.   Right patient was in [facility] and went home yesterday.  States he wants his nursing home aide for so came to this facility instead.  Patient denies any new abnormalities.      Haley Garcia spoke with patient extensively in regards to nursing home placement.  Patient is wanting to be discharged home without further work-up.    Allergies:  No Known Allergies    Problem List:    Patient Active Problem List    Diagnosis Date Noted     Prediabetes 06/13/2023     Priority: Medium     Diastolic dysfunction 03/22/2023     Priority: Medium     Neurogenic bladder 12/15/2022     Priority: Medium     Benign prostatic hyperplasia with urinary retention 11/09/2022     Priority: Medium     Last Assessment & Plan:   Formatting of this note might be different from the original.  Current management with dutasteride - continue.   Frequent urination, uses bedside urinal during the night.       Iron (Fe) deficiency anemia 10/31/2022     Priority: Medium     Pressure injury of deep tissue of buttock 10/31/2022     Priority: Medium     Last Assessment & Plan:   Formatting of this note might be different from the original.  Ongoing wound to right buttock - open area with Mepilex dressing in place.       Paroxysmal atrial fibrillation (H) 08/30/2022     Priority: Medium     Last Assessment & Plan:   Formatting of this note might be different from the original.  Irregular heart rate on exam.   Continue warfarin for anticoagulation, metoprolol for rate stabilization.       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  Symptoms currently managed with use of sertraline with good control.       Lumbosacral spondylosis without myelopathy 08/22/2006     Priority: Medium     Last Assessment & Plan:   Formatting of this note might be different from the original.  Garrison states pain currently controlled with oxcarbazepine and gabapentin use - continue.   Garrison is due for follow-up with Neurology.       Essential (primary) hypertension 08/12/2004     Priority: Medium     Formatting of this note might be different from the original.  office versus real  IMO Update    Last Assessment & Plan:   Formatting of this note is different from the original.  Blood pressure normotensive on current medications.   Continue metoprolol, spironolactone, and furosemide.   Lab Results   Component Value Date    CREAT 0.65 12/08/2022     Lab Results   Component Value Date    GFR 98 12/08/2022          Past Medical History:    Past Medical History:   Diagnosis Date     Antiplatelet or antithrombotic long-term use      Arrhythmia      Hypertension      Other chronic pain        Past Surgical History:    Past Surgical History:   Procedure Laterality Date     BACK SURGERY      4 low back surgeries     CARDIAC SURGERY  12/26/2017    ablation [facility]     CARDIOVERSION  08/2017    [facility]     COLONOSCOPY  03/30/2006    normal      ENDOSCOPY UPPER, COLONOSCOPY, COMBINED N/A 5/4/2018    Procedure: COMBINED ENDOSCOPY UPPER, COLONOSCOPY;  UPPER ENDOSCOPY WITH BIOPSIES AND COLONOSCOPY;  Surgeon: Michel Cardona MD;  Location: [facility] OR     ORTHOPEDIC SURGERY  2007    left knee arthroplasty     ORTHOPEDIC SURGERY      left fourth trigger finger release     ORTHOPEDIC SURGERY  2009    total right hip      ORTHOPEDIC SURGERY  01/15/2015    removal of hardware right lower leg-Dr. Samson [facility]      ORTHOPEDIC SURGERY  05/02/2016    right rotator cuff repair  Dr. Lazo      SPINE SURGERY      anterior/posterior fusion       Family History:    No family history on file.    Social History:  Marital Status:   2  Social History     Tobacco Use     Smoking status: Never     Smokeless tobacco: Never   Substance Use Topics     Alcohol use: Yes     Comment: occasional beer        Medications:    acetaminophen-codeine (TYLENOL #4) 300-60 MG per tablet  Atorvastatin Calcium (LIPITOR PO)  Furosemide (LASIX PO)  Omeprazole (PRILOSEC PO)  OXCARBAZEPINE PO  SOTALOL HCL PO  Warfarin Sodium (COUMADIN PO)          Review of Systems   Constitutional: Negative.    HENT: Negative.    Eyes: Negative.    Respiratory: Negative.    Cardiovascular: Negative.    Gastrointestinal: Negative.    Endocrine: Negative.    Genitourinary: Positive for difficulty urinating.   Musculoskeletal: Positive for gait problem.   Skin: Positive for wound (Buttocks).   Allergic/Immunologic: Negative.    Neurological: Positive for weakness (Lower extremity).   Hematological: Negative.    Psychiatric/Behavioral: Negative.        Physical Exam   BP: 123/72  Pulse: 102  Temp: 98.3  F (36.8  C)  Resp: 16  SpO2: 97 %      Physical Exam  GENERAL APPEARANCE:  The patient is a 78 year old well-developed, well-nourished individual in no acute distress that appears as stated age.  LUNGS:  Breathing is easy.  Breath sounds are equal and clear bilaterally.  No wheezes, rhonchi, or rales.  HEART:  Irregular rate and rhythm.  EXTREMITIES:  Bilateral lower extremity edema present.  MUSCULOSKELETAL:  Bilateral lower extremity weakness.   NEUROLOGIC:  No focal sensory or motor deficits are noted.  Bilateral lower extremity weakness.    PSYCHIATRIC:  The patient is awake, alert, and oriented x4.  Recent and remote memory is intact.  Appropriate mood and affect.   Calm and cooperative with history and physical exam.  SKIN: Did not assess due to patient deferral.    Comment: Discrepancies between my note and notes on behalf of the nursing team or other care providers are secondary to my findings reflecting my physical examination and questioning of the patient.  Any conflicting information provided is not in line with my examination of the patient.      ED Course              ED Course as of 07/19/23 1120   Wed Jul 19, 2023   1059 In to see patient and history/physical completed.    1100 Patient here for chronic complaints seeing if we can get him into a nursing home so he does not have to pay.   spoke with patient and informed him  of nursing home placement process.  Patient deferred any work-up.  Likely discharge.          No results found for this or any previous visit (from the past 24 hour(s)).    Medications - No data to display    Assessments & Plan (with Medical Decision Making)     I have reviewed the nursing notes.    I have reviewed the findings, diagnosis, plan and need for follow up with the patient.      Summary:  Patient presents to the ER today for nursing home placement.  Potential diagnosis which have been considered and evaluated include lower extremity weakness, TIA/CVA, chronic abnormality, as well as others. Many of these have been excluded using the various modalities and assessment as noted on the chart. At the present time, the diagnosis given seems to be the most likely social service consult.  Upon arrival, vitals signs are normal.  The patient is alert and oriented no distress.  Patient does have bilateral lower extremity weakness which is chronic.  Patient denies any new symptoms or concerns since discharge from hospital.  He is requesting nursing home placement.   worker Haley Garcia had extensive explanation of admission criteria and payment.  Patient upset about this and defers any examination or testing.   Patient will be discharged back to his residence per his request..          Impression and plan discussed with patient. Questions answered, concerns addressed, indications for urgent re-evaluation reviewed, and  given. Patient/Parent/Caregiver agree with treatment plan and have no further questions at this time.  AVS provided at discharge.    This note was created by the Dragon Voice Dictation System. Inadvertent typographical errors, due to software recognition problems, may still exist.        New Prescriptions    No medications on file       Final diagnoses:   Leg weakness, bilateral       7/19/2023   [facility]     Aditya Conway APRN CNP  07/19/23 1120

## 2023-07-25 ENCOUNTER — TELEPHONE (OUTPATIENT)
Dept: EMERGENCY MEDICINE | Facility: HOSPITAL | Age: 78
End: 2023-07-25

## 2023-07-25 NOTE — TELEPHONE ENCOUNTER
Care Transitions focused note:      Call from patient.  Not doing well at home.  States he has been trying to get into assisted living but no one will call him back.    Referred to [facility].  I also called [facility] in [place].  They have started screening him already.    Pt informed.  He is an [facility] patient and I do not see that he has any type of care coordination services.  I will look into this.    BLESSING Carlos

## (undated) DEVICE — TUBING-SUCTION 20FT

## (undated) DEVICE — DRSG-KERLIX 6 X 6 3/4 FLUFF

## (undated) DEVICE — SENSOR-OXISENSOR II ADULT

## (undated) DEVICE — FORCEP-COLON BIOPSY LARGE W/NEEDLE 240CM

## (undated) DEVICE — CANISTER-SUCTION 2000CC

## (undated) DEVICE — MOUTHPIECE W/GUARD FOR ENDOSCOPY

## (undated) DEVICE — IRRIGATION-H2O 1000ML

## (undated) RX ORDER — PROPOFOL 10 MG/ML
INJECTION, EMULSION INTRAVENOUS
Status: DISPENSED
Start: 2018-05-04

## (undated) RX ORDER — LIDOCAINE HYDROCHLORIDE 20 MG/ML
INJECTION, SOLUTION EPIDURAL; INFILTRATION; INTRACAUDAL; PERINEURAL
Status: DISPENSED
Start: 2018-05-04

## (undated) RX ORDER — PHENYLEPHRINE HCL IN 0.9% NACL 1 MG/10 ML
SYRINGE (ML) INTRAVENOUS
Status: DISPENSED
Start: 2018-05-04